# Patient Record
Sex: FEMALE | Race: AMERICAN INDIAN OR ALASKA NATIVE
[De-identification: names, ages, dates, MRNs, and addresses within clinical notes are randomized per-mention and may not be internally consistent; named-entity substitution may affect disease eponyms.]

---

## 2019-02-19 ENCOUNTER — HOSPITAL ENCOUNTER (OUTPATIENT)
Dept: HOSPITAL 31 - C.USIC | Age: 44
End: 2019-02-19
Payer: MEDICAID

## 2019-02-19 DIAGNOSIS — N95.0: Primary | ICD-10-CM

## 2019-04-12 ENCOUNTER — HOSPITAL ENCOUNTER (OUTPATIENT)
Dept: HOSPITAL 14 - H.OPSURG | Age: 44
Discharge: HOME | End: 2019-04-12
Attending: OBSTETRICS & GYNECOLOGY
Payer: MEDICAID

## 2019-04-12 VITALS — BODY MASS INDEX: 38.7 KG/M2

## 2019-04-12 VITALS
HEART RATE: 64 BPM | DIASTOLIC BLOOD PRESSURE: 83 MMHG | TEMPERATURE: 98 F | OXYGEN SATURATION: 95 % | SYSTOLIC BLOOD PRESSURE: 132 MMHG

## 2019-04-12 VITALS — RESPIRATION RATE: 18 BRPM

## 2019-04-12 DIAGNOSIS — Z79.899: ICD-10-CM

## 2019-04-12 DIAGNOSIS — M48.02: ICD-10-CM

## 2019-04-12 DIAGNOSIS — D25.9: ICD-10-CM

## 2019-04-12 DIAGNOSIS — N95.0: Primary | ICD-10-CM

## 2019-04-12 DIAGNOSIS — J45.909: ICD-10-CM

## 2019-04-12 LAB
BASOPHILS # BLD AUTO: 0.1 K/UL (ref 0–0.2)
BASOPHILS NFR BLD: 1.3 % (ref 0–2)
EOSINOPHIL # BLD AUTO: 0.2 K/UL (ref 0–0.7)
EOSINOPHIL NFR BLD: 2.7 % (ref 0–4)
ERYTHROCYTE [DISTWIDTH] IN BLOOD BY AUTOMATED COUNT: 15 % (ref 11.5–14.5)
HGB BLD-MCNC: 12.7 G/DL (ref 12–16)
LYMPHOCYTES # BLD AUTO: 3.9 K/UL (ref 1–4.3)
LYMPHOCYTES NFR BLD AUTO: 46.8 % (ref 20–40)
MCH RBC QN AUTO: 26 PG (ref 27–31)
MCHC RBC AUTO-ENTMCNC: 31.8 G/DL (ref 33–37)
MCV RBC AUTO: 81.7 FL (ref 81–99)
MONOCYTES # BLD: 0.5 K/UL (ref 0–0.8)
MONOCYTES NFR BLD: 5.7 % (ref 0–10)
NEUTROPHILS # BLD: 3.6 K/UL (ref 1.8–7)
NEUTROPHILS NFR BLD AUTO: 43.5 % (ref 50–75)
NRBC BLD AUTO-RTO: 0 % (ref 0–0)
PLATELET # BLD: 278 K/UL (ref 130–400)
PMV BLD AUTO: 8.4 FL (ref 7.2–11.7)
RBC # BLD AUTO: 4.87 MIL/UL (ref 3.8–5.2)
WBC # BLD AUTO: 8.2 K/UL (ref 4.8–10.8)

## 2019-04-12 PROCEDURE — 86900 BLOOD TYPING SEROLOGIC ABO: CPT

## 2019-04-12 PROCEDURE — 58558 HYSTEROSCOPY BIOPSY: CPT

## 2019-04-12 PROCEDURE — 85025 COMPLETE CBC W/AUTO DIFF WBC: CPT

## 2019-04-12 PROCEDURE — 86850 RBC ANTIBODY SCREEN: CPT

## 2019-04-12 PROCEDURE — 36415 COLL VENOUS BLD VENIPUNCTURE: CPT

## 2019-04-12 PROCEDURE — 88305 TISSUE EXAM BY PATHOLOGIST: CPT

## 2019-04-12 NOTE — CP.SDSHP
Same Day Surgery H & P





- History


Proposed Procedure: D&C Hysteroscopy/possbile Myosure procedure


Pre-Op Diagnosis: Postmenopausal bleeding





- Previous Medical/Surgical History


Pulmonary: Asthma, Other


Comments: Hx Pulm Embolus; GERD; Anxiety;


Previous Surgical History: D&C 2015; gallbladder





- Allergies


Allergies: 


Allergies





No Known Allergies Allergy (Unverified 02/07/14 14:55)


   











- Current Medications


Current Medications: 


Warelto 20mg po QD; albuterol





- Physical Exam


General Appearance: In NAD


Vital Signs: 


                                   Vital Signs











  04/12/19 04/12/19





  09:04 09:11


 


Temperature 98.5 F 


 


Pulse Rate 61 61


 


Respiratory 18 





Rate  


 


Blood Pressure 105/61 


 


O2 Sat by Pulse 98 





Oximetry  











Mental Status: Alert & Oriented x3


Neuro: WNL


Heart: WNL


Lungs: WNL


GI: WNL





- {Optional Preform as Required}


Abdomen: WNL


GYN: WNL





- Impression


Impression: Postmenopausal bleeding; Hx fibroid uterus - Condition, procedure 

with its risks/complications discussed.  Her questions answered.   Informed 

consent obtained


Pt. Evaluated Today:Candidate for Anesthesia & Procedure: Yes (Cleared by PMD; 

to take Lovenox x 3d then re-start Xarelto)





- Date & Time


Date: 04/12/19


Time: 09:15





Short Stay Discharge





- Short Stay Discharge


Admitting Diagnosis/Reason for Visit: N95.0


Disposition: HOME/ ROUTINE

## 2019-04-12 NOTE — PCM.SURG1
Surgeon's Initial Post Op Note





- Surgeon's Notes


Surgeon: OLMAN Flores DO


Assistant: none


Type of Anesthesia: General Endo


Anesthesia Administered By: Dr CASEY Payne


Pre-Operative Diagnosis: post menopausal bleeding


Operative Findings: -stenotic cervix.  -endometrial tissue - moderate amount.  -

fractional D&C hysteroscopy/Myosure.  -ECC/EMC


Post-Operative Diagnosis: same


Operation Performed: Dilation/hysteroscopy/Myosure procedure - Fractional D&C


Specimen/Specimens Removed: ECC / EMC


Estimated Blood Loss: EBL {In ML}: 50


Blood Products Given: N/A


Drains Used: No Drains


Date of Surgery/Procedure: 04/12/19


Time of Surgery/Procedure: 10:30

## 2019-04-16 NOTE — OP
PROCEDURE DATE:  04/12/2019



PREOPERATIVE DIAGNOSIS:  Postmenopausal bleeding.



POSTOPERATIVE DIAGNOSIS:  Cervical stenosis.



SURGEON:  Martin Flores DO



ASSISTANT:  None.



ANESTHESIOLOGIST:  Juan Payne MD



ANESTHESIA:  General endotracheal.



OPERATIVE FINDINGS:  Stenotic cervix, endometrial tissue, moderate amount

was noted and collected.  Threshold D and C, hysteroscopy, MyoSure

procedure was performed, endometrial curetting, and endocervical curetting.

Endocervical curetting and endometrial curetting obtained.



PROCEDURE PERFORMED:  Dilation, hysteroscopy, MyoSure procedure, fractional

dilation and curettage.



ESTIMATED BLOOD LOSS:  50 mL.  No bloods given.



DESCRIPTION OF PROCEDURE:  The patient was brought to the operating room. 

Compression boots were placed on both lower extremities.  She was placed in

the supine position after successful induction of general anesthesia by Dr. Payne.  She was placed in lithotomy position.  She was draped and prepped

in the usual sterile manner.  Catheter was used to drain the bladder with

contents.  A weighted speculum was placed in the posterior fornix of the

vagina, right-angle retractor in the anterior fornix of the vagina to

visualize the cervix.  The cervix was grasped at the 12 o'clock position

using a single-tooth tenaculum.  Thereafter, using dilators, the cervix was

gradually dilated.  Thereafter, hysteroscope was introduced into the

endocervical canal, into the endometrium, no mass lesions were noted in the

endocervix, in the endometrial cavity noted to be a moderate amount of

tissue.  Using MyoSure procedure, a specimen was obtained, curetting was

performed, and hysteroscope was removed.  Thereafter, using Kevorkian

curette, curetting was performed of the endocervical canal.  Then, this was

sent to pathology lab.  Using a small curette, additional endometrial

curetting was obtained and was sent with the first specimen.  All equipment

was removed and accounted for.  Good hemostasis assured.  She was brought

to the recovery room in stable condition and scheduled to follow up at the

office in two to three weeks to discuss pathology reports.







__________________________________________

Martin Flores DO





DD:  04/15/2019 16:47:48

DT:  04/16/2019 1:44:54

Job # [id number]